# Patient Record
Sex: MALE | Race: BLACK OR AFRICAN AMERICAN | Employment: FULL TIME | URBAN - METROPOLITAN AREA
[De-identification: names, ages, dates, MRNs, and addresses within clinical notes are randomized per-mention and may not be internally consistent; named-entity substitution may affect disease eponyms.]

---

## 2017-03-07 ENCOUNTER — OFFICE VISIT (OUTPATIENT)
Dept: FAMILY MEDICINE CLINIC | Facility: CLINIC | Age: 42
End: 2017-03-07

## 2017-03-07 VITALS
SYSTOLIC BLOOD PRESSURE: 133 MMHG | HEART RATE: 88 BPM | HEIGHT: 71 IN | WEIGHT: 315 LBS | BODY MASS INDEX: 44.1 KG/M2 | DIASTOLIC BLOOD PRESSURE: 83 MMHG

## 2017-03-07 DIAGNOSIS — R35.0 URINARY FREQUENCY: Primary | ICD-10-CM

## 2017-03-07 DIAGNOSIS — E66.01 MORBID OBESITY, UNSPECIFIED OBESITY TYPE (HCC): ICD-10-CM

## 2017-03-07 PROCEDURE — 99212 OFFICE O/P EST SF 10 MIN: CPT | Performed by: FAMILY MEDICINE

## 2017-03-07 PROCEDURE — 99202 OFFICE O/P NEW SF 15 MIN: CPT | Performed by: FAMILY MEDICINE

## 2017-03-07 NOTE — PROGRESS NOTES
HPI:    Oleg Cortes is a 35year old male presents to clinic as a new patient with concerns regarding diabetes.  Patient states that he has multiple family members with this, states that his mother and older brother passed away with complications of di Left Ear: Tympanic membrane, external ear and ear canal normal.   Nose: Nose normal.   Mouth/Throat: Uvula is midline, oropharynx is clear and moist and mucous membranes are normal.   Neck: Normal range of motion. Neck supple. No thyromegaly present.    C

## 2017-03-10 LAB
ABSOLUTE BASOPHILS: 32 CELLS/UL (ref 0–200)
ABSOLUTE EOSINOPHILS: 54 CELLS/UL (ref 15–500)
ABSOLUTE LYMPHOCYTES: 1744 CELLS/UL (ref 850–3900)
ABSOLUTE MONOCYTES: 319 CELLS/UL (ref 200–950)
ABSOLUTE NEUTROPHILS: 3251 CELLS/UL (ref 1500–7800)
ALBUMIN/GLOBULIN RATIO: 1.2 (CALC) (ref 1–2.5)
ALBUMIN: 3.9 G/DL (ref 3.6–5.1)
ALKALINE PHOSPHATASE: 106 U/L (ref 40–115)
ALT: 52 U/L (ref 9–46)
AST: 23 U/L (ref 10–40)
BASOPHILS: 0.6 %
BILIRUBIN, TOTAL: 0.5 MG/DL (ref 0.2–1.2)
BUN: 16 MG/DL (ref 7–25)
CALCIUM: 9 MG/DL (ref 8.6–10.3)
CARBON DIOXIDE: 21 MMOL/L (ref 20–31)
CHLORIDE: 101 MMOL/L (ref 98–110)
CHOL/HDLC RATIO: 2.6 (CALC)
CHOLESTEROL, TOTAL: 148 MG/DL (ref 125–200)
CREATININE, RANDOM URINE: 130 MG/DL (ref 20–370)
CREATININE: 1.11 MG/DL (ref 0.6–1.35)
EGFR IF AFRICN AM: 94 ML/MIN/1.73M2
EGFR IF NONAFRICN AM: 81 ML/MIN/1.73M2
EOSINOPHILS: 1 %
GLOBULIN: 3.3 G/DL (CALC) (ref 1.9–3.7)
GLUCOSE: 365 MG/DL (ref 65–99)
HDL CHOLESTEROL: 58 MG/DL
HEMATOCRIT: 43.7 % (ref 38.5–50)
HEMOGLOBIN A1C: 14.3 % OF TOTAL HGB
HEMOGLOBIN: 14.3 G/DL (ref 13.2–17.1)
LDL-CHOLESTEROL: 71 MG/DL (CALC)
LYMPHOCYTES: 32.3 %
MCH: 28.4 PG (ref 27–33)
MCHC: 32.9 G/DL (ref 32–36)
MCV: 86.5 FL (ref 80–100)
MICROALBUMIN/CREATININE RATIO, RANDOM URINE: 60 MCG/MG CREAT
MICROALBUMIN: 7.8 MG/DL
MONOCYTES: 5.9 %
MPV: 10.6 FL (ref 7.5–12.5)
NEUTROPHILS: 60.2 %
NON-HDL CHOLESTEROL: 90 MG/DL (CALC)
PLATELET COUNT: 200 THOUSAND/UL (ref 140–400)
POTASSIUM: 3.7 MMOL/L (ref 3.5–5.3)
PROTEIN, TOTAL: 7.2 G/DL (ref 6.1–8.1)
RDW: 15.3 % (ref 11–15)
RED BLOOD CELL COUNT: 5.04 MILLION/UL (ref 4.2–5.8)
SODIUM: 136 MMOL/L (ref 135–146)
TRIGLYCERIDES: 94 MG/DL
TSH: 3.21 MIU/L (ref 0.4–4.5)
WHITE BLOOD CELL COUNT: 5.4 THOUSAND/UL (ref 3.8–10.8)

## 2017-03-13 ENCOUNTER — TELEPHONE (OUTPATIENT)
Dept: FAMILY MEDICINE CLINIC | Facility: CLINIC | Age: 42
End: 2017-03-13

## 2017-03-13 DIAGNOSIS — E11.9 TYPE 2 DIABETES MELLITUS WITHOUT COMPLICATION, WITHOUT LONG-TERM CURRENT USE OF INSULIN (HCC): ICD-10-CM

## 2017-03-13 DIAGNOSIS — R73.09 ELEVATED HEMOGLOBIN A1C: Primary | ICD-10-CM

## 2017-03-13 RX ORDER — CIPROFLOXACIN 500 MG/1
500 TABLET, FILM COATED ORAL 2 TIMES DAILY
Qty: 14 TABLET | Refills: 0 | Status: SHIPPED | OUTPATIENT
Start: 2017-03-13 | End: 2017-03-20

## 2017-03-13 NOTE — TELEPHONE ENCOUNTER
Spoke to patient about results. Elevated A1C of 14.3 and protein in urine - patient informed her is diabetic. Will likely need to be started on insulin as A1C is too high for oral meds. Referred to Endo, strongly advised to make appt within 1 week.   + ur

## 2017-04-05 ENCOUNTER — OFFICE VISIT (OUTPATIENT)
Dept: ENDOCRINOLOGY CLINIC | Facility: CLINIC | Age: 42
End: 2017-04-05

## 2017-04-05 VITALS
DIASTOLIC BLOOD PRESSURE: 80 MMHG | SYSTOLIC BLOOD PRESSURE: 147 MMHG | BODY MASS INDEX: 44.1 KG/M2 | HEIGHT: 71 IN | HEART RATE: 62 BPM | WEIGHT: 315 LBS

## 2017-04-05 DIAGNOSIS — E11.65 UNCONTROLLED TYPE 2 DIABETES MELLITUS WITH HYPERGLYCEMIA, WITHOUT LONG-TERM CURRENT USE OF INSULIN (HCC): Primary | ICD-10-CM

## 2017-04-05 PROBLEM — E11.9 DIABETES (HCC): Status: ACTIVE | Noted: 2017-04-05

## 2017-04-05 PROCEDURE — 36416 COLLJ CAPILLARY BLOOD SPEC: CPT | Performed by: INTERNAL MEDICINE

## 2017-04-05 PROCEDURE — 82962 GLUCOSE BLOOD TEST: CPT | Performed by: INTERNAL MEDICINE

## 2017-04-05 PROCEDURE — 99244 OFF/OP CNSLTJ NEW/EST MOD 40: CPT | Performed by: INTERNAL MEDICINE

## 2017-04-05 NOTE — H&P
New Patient Visit - Diabetes    CONSULT - Reason for Visit:  Diabetes management.     Requesting Physician: Chanelle Muñoz MD    CHIEF COMPLAINT:    DM     HISTORY OF PRESENT ILLNESS:   Yoel Wilks is a 35year old male who presents to establish care fo Comment: occ      FAMILY HISTORY:   History reviewed. No pertinent family history.     ASSESSMENTS:        REVIEW OF SYSTEMS:  Constitutional: Negative for: weight change, fever, fatigue, cold/heat intolerance  Eyes: Negative for:  Visual jeong DM:       Plan:  Discussed the pathogenesis, natural course of diabetes.  Patient understands the importance of glycemic control and the implications of uncontrolled diabetes including Diabetic ketoacidosis and various micro vascular and macrovascular compl week. g). Hypoglycemia recognition and management discussed    2. Patient’s BP is slightly elevated today. Discussed low salt diet.   3. LDL is normal.   A) Discussed lifestyle modifications including reductions in dietary total and saturated fat, weight

## 2017-04-05 NOTE — PATIENT INSTRUCTIONS
Start medications as follows:    1. Metformin 500 mg ( half a pill) with breakfast and dinner for 2-3 days . If you tolerate it well, can go up to 1000 mg (one pill) with breakfast and dinner. 2. Victoza:  Start with 0.6 for 3 days.  If you tolerate it

## 2017-04-10 ENCOUNTER — TELEPHONE (OUTPATIENT)
Dept: ENDOCRINOLOGY CLINIC | Facility: CLINIC | Age: 42
End: 2017-04-10

## 2017-04-10 NOTE — TELEPHONE ENCOUNTER
Spoke with Isela Higgins. He started Victoza on 4/6/2017. After starting he noticed back pain, nausea, diarrhea and pain in chest. He has been taking the 0.6mg dose and took it every day except he skipped it yesterday and today due to symptoms.  Also taking Metform

## 2017-04-10 NOTE — TELEPHONE ENCOUNTER
Pt states that he has been having problems with diarrhea, back pain and chest pain and thinks it is from [address] [place] [address] Street. Symptoms started 2 days ago. Call transferred to RN.

## 2017-04-10 NOTE — TELEPHONE ENCOUNTER
Called Dmitri. Informed him that he needs to stop Victoza. He will do so. Informed him of Jardiance and it's side effects. He is wondering if he can wait to start any new medication until his next F/U on 4/25.  He will go to the ER if his symptoms worsen or

## 2017-04-10 NOTE — TELEPHONE ENCOUNTER
Spoke with Adelita Wong and informed him of Dr. Kenzie Leach message below. He verbalized his understanding and will call if his BG are presistently >200.

## 2017-04-10 NOTE — TELEPHONE ENCOUNTER
Stop Victoza. Start Jardiance 10 mg daily. He will need the coupon. Please tell him that if it is not covered, he should let us know. Please let him know of SE of UTI and fungal infections. Please encourage him to drink a lot of water.   If symptoms wors

## 2017-04-25 ENCOUNTER — OFFICE VISIT (OUTPATIENT)
Dept: ENDOCRINOLOGY CLINIC | Facility: CLINIC | Age: 42
End: 2017-04-25

## 2017-04-25 VITALS
DIASTOLIC BLOOD PRESSURE: 83 MMHG | HEART RATE: 81 BPM | HEIGHT: 71 IN | BODY MASS INDEX: 44.1 KG/M2 | SYSTOLIC BLOOD PRESSURE: 121 MMHG | WEIGHT: 315 LBS

## 2017-04-25 DIAGNOSIS — E11.65 UNCONTROLLED TYPE 2 DIABETES MELLITUS WITH HYPERGLYCEMIA, WITHOUT LONG-TERM CURRENT USE OF INSULIN (HCC): Primary | ICD-10-CM

## 2017-04-25 PROCEDURE — 36416 COLLJ CAPILLARY BLOOD SPEC: CPT | Performed by: INTERNAL MEDICINE

## 2017-04-25 PROCEDURE — 82962 GLUCOSE BLOOD TEST: CPT | Performed by: INTERNAL MEDICINE

## 2017-04-25 PROCEDURE — 99213 OFFICE O/P EST LOW 20 MIN: CPT | Performed by: INTERNAL MEDICINE

## 2017-04-25 NOTE — PROGRESS NOTES
Return Office Visit     CHIEF COMPLAINT:  Patient presents with:  Diabetes       HISTORY OF PRESENT ILLNESS:  Vinod Martines is a 35year old male who presents for follow up for for Diabetes.     DM HISTORY  Diagnosed: 3/2017    HISTORY OF DIABETES COMPLICA fever, fatigue, cold/heat intolerance  Eyes: Negative for:  Visual changes, proptosis, blurring  ENT: Negative for:  dysphagia, neck swelling, dysphonia  Respiratory: Negative for:  dyspnea, cough  Cardiovascular: Negative for:  chest pain, palpitations, o glycemic control and the implications of uncontrolled diabetes including Diabetic ketoacidosis and various micro vascular and macrovascular complications. Goal a1c: < 7 %   Goal Fasting, Goal pre meal:   a).  Medications:  Stopped MTF and victoza d

## 2017-07-28 ENCOUNTER — OFFICE VISIT (OUTPATIENT)
Dept: FAMILY MEDICINE CLINIC | Facility: CLINIC | Age: 42
End: 2017-07-28

## 2017-07-28 VITALS
DIASTOLIC BLOOD PRESSURE: 81 MMHG | BODY MASS INDEX: 52 KG/M2 | TEMPERATURE: 98 F | RESPIRATION RATE: 18 BRPM | SYSTOLIC BLOOD PRESSURE: 128 MMHG | HEART RATE: 54 BPM | WEIGHT: 315 LBS

## 2017-07-28 DIAGNOSIS — E11.9 TYPE 2 DIABETES MELLITUS WITHOUT COMPLICATION, WITHOUT LONG-TERM CURRENT USE OF INSULIN (HCC): Primary | ICD-10-CM

## 2017-07-28 DIAGNOSIS — G43.809 OTHER TYPE OF MIGRAINE: ICD-10-CM

## 2017-07-28 PROCEDURE — 99214 OFFICE O/P EST MOD 30 MIN: CPT | Performed by: FAMILY MEDICINE

## 2017-07-28 PROCEDURE — 99212 OFFICE O/P EST SF 10 MIN: CPT | Performed by: FAMILY MEDICINE

## 2017-07-28 NOTE — PROGRESS NOTES
HPI:    Armin Khan is a 38year old male presents to clinic for follow up regarding type 2 DM. States that at the khanh of April, he saw Endo and was started on Victoza, had a reaction to the med so stopped taking it.  Did nto follow up and is currently Negative. Neurological: Positive for headaches.          PHYSICAL EXAM:      07/28/17  0851   BP: 128/81   Pulse: 54   Resp: 18   Temp: 98 °F (36.7 °C)   TempSrc: Oral   Weight: (!) 373 lb (169.2 kg)      Physical Exam   Constitutional: He is well-develo

## 2017-07-29 LAB
ALBUMIN/GLOBULIN RATIO: 1.2 (CALC) (ref 1–2.5)
ALBUMIN: 3.9 G/DL (ref 3.6–5.1)
ALKALINE PHOSPHATASE: 81 U/L (ref 40–115)
ALT: 15 U/L (ref 9–46)
AST: 12 U/L (ref 10–40)
BILIRUBIN, TOTAL: 0.4 MG/DL (ref 0.2–1.2)
BUN: 17 MG/DL (ref 7–25)
CALCIUM: 9 MG/DL (ref 8.6–10.3)
CARBON DIOXIDE: 27 MMOL/L (ref 20–31)
CHLORIDE: 108 MMOL/L (ref 98–110)
CREATININE, RANDOM URINE: 163 MG/DL (ref 20–370)
CREATININE: 1.15 MG/DL (ref 0.6–1.35)
EGFR IF AFRICN AM: 89 ML/MIN/1.73M2
EGFR IF NONAFRICN AM: 77 ML/MIN/1.73M2
GLOBULIN: 3.3 G/DL (CALC) (ref 1.9–3.7)
GLUCOSE: 100 MG/DL (ref 65–99)
HEMOGLOBIN A1C: 6.3 % OF TOTAL HGB
MICROALBUMIN/CREATININE RATIO, RANDOM URINE: 6 MCG/MG CREAT
MICROALBUMIN: 0.9 MG/DL
POTASSIUM: 4.1 MMOL/L (ref 3.5–5.3)
PROTEIN, TOTAL: 7.2 G/DL (ref 6.1–8.1)
SODIUM: 143 MMOL/L (ref 135–146)

## 2017-08-01 ENCOUNTER — TELEPHONE (OUTPATIENT)
Dept: ADMINISTRATIVE | Age: 42
End: 2017-08-01

## 2017-08-01 NOTE — TELEPHONE ENCOUNTER
Good afternoon Dr. Gutierrze Read form pending in [employer]. Patient is requesting intermittent time off for migraines 4-5 days per month, do you approve? Please advise.     Thanks  Zbigniew Wilson

## 2017-08-14 ENCOUNTER — OFFICE VISIT (OUTPATIENT)
Dept: FAMILY MEDICINE CLINIC | Facility: CLINIC | Age: 42
End: 2017-08-14

## 2017-08-14 ENCOUNTER — TELEPHONE (OUTPATIENT)
Dept: FAMILY MEDICINE CLINIC | Facility: CLINIC | Age: 42
End: 2017-08-14

## 2017-08-14 ENCOUNTER — OFFICE VISIT (OUTPATIENT)
Dept: ENDOCRINOLOGY CLINIC | Facility: CLINIC | Age: 42
End: 2017-08-14

## 2017-08-14 VITALS
WEIGHT: 315 LBS | DIASTOLIC BLOOD PRESSURE: 90 MMHG | HEART RATE: 68 BPM | BODY MASS INDEX: 42.66 KG/M2 | SYSTOLIC BLOOD PRESSURE: 128 MMHG | HEIGHT: 72 IN

## 2017-08-14 VITALS
WEIGHT: 273 LBS | BODY MASS INDEX: 36.98 KG/M2 | HEIGHT: 72 IN | HEART RATE: 80 BPM | TEMPERATURE: 98 F | SYSTOLIC BLOOD PRESSURE: 138 MMHG | DIASTOLIC BLOOD PRESSURE: 81 MMHG | RESPIRATION RATE: 14 BRPM

## 2017-08-14 DIAGNOSIS — E11.9 CONTROLLED TYPE 2 DIABETES MELLITUS WITHOUT COMPLICATION, WITHOUT LONG-TERM CURRENT USE OF INSULIN (HCC): Primary | ICD-10-CM

## 2017-08-14 DIAGNOSIS — S89.91XD KNEE INJURY, RIGHT, SUBSEQUENT ENCOUNTER: ICD-10-CM

## 2017-08-14 DIAGNOSIS — G43.809 OTHER TYPE OF MIGRAINE: Primary | ICD-10-CM

## 2017-08-14 LAB
GLUCOSE BLOOD: 107
TEST STRIP LOT #: NORMAL NUMERIC

## 2017-08-14 PROCEDURE — 99214 OFFICE O/P EST MOD 30 MIN: CPT | Performed by: FAMILY MEDICINE

## 2017-08-14 PROCEDURE — 99213 OFFICE O/P EST LOW 20 MIN: CPT | Performed by: INTERNAL MEDICINE

## 2017-08-14 PROCEDURE — 82962 GLUCOSE BLOOD TEST: CPT | Performed by: INTERNAL MEDICINE

## 2017-08-14 PROCEDURE — 99212 OFFICE O/P EST SF 10 MIN: CPT | Performed by: FAMILY MEDICINE

## 2017-08-14 PROCEDURE — 36416 COLLJ CAPILLARY BLOOD SPEC: CPT | Performed by: INTERNAL MEDICINE

## 2017-08-14 NOTE — PROGRESS NOTES
Return Office Visit     CHIEF COMPLAINT:    DM     HISTORY OF PRESENT ILLNESS:  Kevin Llanos is a 38year old male who presents for follow up for for Diabetes.     DM HISTORY  Diagnosed: 3/2017    HISTORY OF DIABETES COMPLICATIONS: :  History of Retinopat weakness  Genito-Urinary: Negative for: dysuria, frequency  Psychiatric: Negative for:  depression, anxiety  Hematology/Lymphatics: Negative for: bruising, lower extremity edema  Endocrine: Negative for: polyuria, polydypsia  Skin: Negative for: rash, blis a referral for eye exam. Would like him to schedule it once BG are better controlled. d). Foot exam: Daily feet exam explained, Monofilament sensation normal.   e). BG log maintainence explained in great detail, to get log and glucometer on next visit.  Gl

## 2017-08-14 NOTE — TELEPHONE ENCOUNTER
Pt dropped off forms while at appt w/ SK today. Forms faxed and interoffice to [employer] for completion. Confirmation rcvd. Copy sent to scan.

## 2017-08-15 NOTE — PROGRESS NOTES
HPI:    Albino Mendoza is a 38year old male presents to clinic for follow up. Migraines- states that on August 3rd he experienced symptoms so took the the day off.  States that this was 2 days before his FMLA went through so was asked to fill out anoth Pulmonary/Chest: Effort normal and breath sounds normal. No respiratory distress. He has no wheezes. He has no rales. Musculoskeletal:   Right knee - + swelling, no diffuse tenderness, Lachman's neg    Neurological: He is alert.  Gait normal.   Psychiat

## 2017-09-05 ENCOUNTER — HOSPITAL ENCOUNTER (OUTPATIENT)
Dept: MRI IMAGING | Facility: HOSPITAL | Age: 42
Discharge: HOME | End: 2017-09-05
Attending: FAMILY MEDICINE
Payer: COMMERCIAL

## 2017-09-05 DIAGNOSIS — S89.91XD KNEE INJURY, RIGHT, SUBSEQUENT ENCOUNTER: ICD-10-CM

## 2017-09-05 PROCEDURE — 73721 MRI JNT OF LWR EXTRE W/O DYE: CPT | Performed by: FAMILY MEDICINE

## 2017-09-12 ENCOUNTER — TELEPHONE (OUTPATIENT)
Dept: FAMILY MEDICINE CLINIC | Facility: CLINIC | Age: 42
End: 2017-09-12

## 2017-09-12 DIAGNOSIS — R93.6 ABNORMAL MRI, KNEE: Primary | ICD-10-CM

## 2017-09-12 NOTE — TELEPHONE ENCOUNTER
Patient inform of MRI results, will follow up with Ortho. Referral placed, info given to schedule. Patient verbalized understanding, all questions answered.

## 2017-12-12 ENCOUNTER — TELEPHONE (OUTPATIENT)
Dept: FAMILY MEDICINE CLINIC | Facility: CLINIC | Age: 42
End: 2017-12-12

## 2017-12-12 NOTE — TELEPHONE ENCOUNTER
Pt calling to request to speak with Dr. Maggie Lang regarding his health and FMLA.  Pt just has a few question that he would like to ask her   Please advise

## 2017-12-13 NOTE — TELEPHONE ENCOUNTER
Patient informed he would need to schedule appointment with physician. Patient verbalized understanding and will call back to schedule.

## 2018-02-16 ENCOUNTER — TELEPHONE (OUTPATIENT)
Dept: FAMILY MEDICINE CLINIC | Facility: CLINIC | Age: 43
End: 2018-02-16

## 2018-02-16 ENCOUNTER — OFFICE VISIT (OUTPATIENT)
Dept: FAMILY MEDICINE CLINIC | Facility: CLINIC | Age: 43
End: 2018-02-16

## 2018-02-16 VITALS
BODY MASS INDEX: 42.66 KG/M2 | WEIGHT: 315 LBS | HEART RATE: 90 BPM | TEMPERATURE: 98 F | HEIGHT: 72 IN | DIASTOLIC BLOOD PRESSURE: 80 MMHG | RESPIRATION RATE: 14 BRPM | SYSTOLIC BLOOD PRESSURE: 128 MMHG

## 2018-02-16 DIAGNOSIS — G43.709 CHRONIC MIGRAINE WITHOUT AURA WITHOUT STATUS MIGRAINOSUS, NOT INTRACTABLE: Primary | ICD-10-CM

## 2018-02-16 DIAGNOSIS — E11.9 TYPE 2 DIABETES MELLITUS WITHOUT COMPLICATION, WITHOUT LONG-TERM CURRENT USE OF INSULIN (HCC): ICD-10-CM

## 2018-02-16 DIAGNOSIS — R63.5 WEIGHT GAIN: ICD-10-CM

## 2018-02-16 PROCEDURE — 99212 OFFICE O/P EST SF 10 MIN: CPT | Performed by: FAMILY MEDICINE

## 2018-02-16 PROCEDURE — 99214 OFFICE O/P EST MOD 30 MIN: CPT | Performed by: FAMILY MEDICINE

## 2018-02-16 NOTE — TELEPHONE ENCOUNTER
Patient came in for office visit and dropped of FMLA forms to be completed. Copy of forms faxed to [employer] and sent for scanning.

## 2018-02-16 NOTE — PROGRESS NOTES
HPI:    Abhijeet Pereira is a 38year old male presents to clinic for follow up. Says that his job and girlfriend stresses him out which causes migraines. Gets about 2-3 a week. Takes Advil which helps. Needs [facility] papers updated.    Also, has gained 20 lbs breath sounds normal. No respiratory distress. Lymphadenopathy:     He has no cervical adenopathy. Neurological: He is alert. Gait normal.   Psychiatric: Affect normal.   Vitals reviewed.          ASSESSMENT/PLAN:   Chronic migraine without aura without

## 2018-02-17 LAB
ALBUMIN/GLOBULIN RATIO: 1.3 (CALC) (ref 1–2.5)
ALBUMIN: 4.1 G/DL (ref 3.6–5.1)
ALKALINE PHOSPHATASE: 77 U/L (ref 40–115)
ALT: 20 U/L (ref 9–46)
AST: 13 U/L (ref 10–40)
BILIRUBIN, TOTAL: 0.4 MG/DL (ref 0.2–1.2)
BUN: 16 MG/DL (ref 7–25)
CALCIUM: 9.1 MG/DL (ref 8.6–10.3)
CARBON DIOXIDE: 25 MMOL/L (ref 20–31)
CHLORIDE: 104 MMOL/L (ref 98–110)
CHOL/HDLC RATIO: 2.4 (CALC)
CHOLESTEROL, TOTAL: 145 MG/DL
CREATININE: 1.09 MG/DL (ref 0.6–1.35)
EGFR IF AFRICN AM: 95 ML/MIN/1.73M2
EGFR IF NONAFRICN AM: 82 ML/MIN/1.73M2
GLOBULIN: 3.2 G/DL (CALC) (ref 1.9–3.7)
GLUCOSE: 309 MG/DL (ref 65–99)
HDL CHOLESTEROL: 60 MG/DL
HEMOGLOBIN A1C: 8.7 % OF TOTAL HGB
LDL-CHOLESTEROL: 71 MG/DL (CALC)
NON-HDL CHOLESTEROL: 85 MG/DL (CALC)
POTASSIUM: 4.4 MMOL/L (ref 3.5–5.3)
PROTEIN, TOTAL: 7.3 G/DL (ref 6.1–8.1)
SODIUM: 138 MMOL/L (ref 135–146)
TRIGLYCERIDES: 68 MG/DL

## 2018-02-19 ENCOUNTER — TELEPHONE (OUTPATIENT)
Dept: FAMILY MEDICINE CLINIC | Facility: CLINIC | Age: 43
End: 2018-02-19

## 2018-02-19 NOTE — TELEPHONE ENCOUNTER
Called patient to discuss results. Informed of an A1C of 8.7. States that he absolutely does not want to start meds again, last time they \"messed him up. \" Would like to try diet changes.  I did advise that he start taking Metformin again at the very least

## 2018-02-22 NOTE — TELEPHONE ENCOUNTER
Form pending provider sign off and pt will need to pay and sign HIPAA release. NK informed pt.   MIGDALIA

## 2018-02-22 NOTE — TELEPHONE ENCOUNTER
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 Logged for processing.  NK

## 2018-02-22 NOTE — TELEPHONE ENCOUNTER
Spoke w/ pt. Will come to [facility] tomorrow to sign release an make payment. Form is due 79/33, recert.  NK

## 2018-02-22 NOTE — TELEPHONE ENCOUNTER
Dr. Anoop Rojas,    Pt requesting recert on FMLA. DX - Chronic migraines  1 to 3 days per mo and 1 to 2 appts every 6 mos. If you approve please sign off. Please sign off on form:  -Highlight the patient and hit \"Chart\" button.   -In Chart Review,

## 2018-02-23 NOTE — TELEPHONE ENCOUNTER
Pt came to [employer]PO to sign HIPPA,FCR and pay $25 processing fee.  Forms scanned to [employer] and copy left in [facility] [employer]

## 2018-05-07 ENCOUNTER — TELEPHONE (OUTPATIENT)
Dept: FAMILY MEDICINE CLINIC | Facility: CLINIC | Age: 43
End: 2018-05-07

## 2018-05-07 NOTE — TELEPHONE ENCOUNTER
FMLA forms received in the [place] office via fax for Isela Higgins. Forms emailed to [email or web address], original forms left in the [place] office for [place].

## 2018-05-07 NOTE — TELEPHONE ENCOUNTER
Pt is requesting a sooner appt to get FMLA papers completed and states need to be completed and submitted back by Monday, 5/14/2018 and states can leave a voicemail.      Please reply to pool: PARRISH Da Silva

## 2018-05-09 ENCOUNTER — OFFICE VISIT (OUTPATIENT)
Dept: FAMILY MEDICINE CLINIC | Facility: CLINIC | Age: 43
End: 2018-05-09

## 2018-05-09 ENCOUNTER — TELEPHONE (OUTPATIENT)
Dept: FAMILY MEDICINE CLINIC | Facility: CLINIC | Age: 43
End: 2018-05-09

## 2018-05-09 VITALS
HEIGHT: 72 IN | TEMPERATURE: 97 F | HEART RATE: 79 BPM | DIASTOLIC BLOOD PRESSURE: 76 MMHG | SYSTOLIC BLOOD PRESSURE: 123 MMHG | RESPIRATION RATE: 14 BRPM

## 2018-05-09 DIAGNOSIS — G43.709 CHRONIC MIGRAINE WITHOUT AURA WITHOUT STATUS MIGRAINOSUS, NOT INTRACTABLE: Primary | ICD-10-CM

## 2018-05-09 DIAGNOSIS — E11.9 TYPE 2 DIABETES MELLITUS WITHOUT COMPLICATION, WITHOUT LONG-TERM CURRENT USE OF INSULIN (HCC): ICD-10-CM

## 2018-05-09 PROCEDURE — 99212 OFFICE O/P EST SF 10 MIN: CPT | Performed by: FAMILY MEDICINE

## 2018-05-09 PROCEDURE — 99214 OFFICE O/P EST MOD 30 MIN: CPT | Performed by: FAMILY MEDICINE

## 2018-05-09 NOTE — TELEPHONE ENCOUNTER
Lisa Magallanes had an appt with Dr. Juana Agee today, FMLA forms were left for completion. Forms, signed HIPAA and payment form emailed to [email or web address], original forms left in the [facility] office for [employer].

## 2018-05-09 NOTE — TELEPHONE ENCOUNTER
FMLA recrt form for Dr. Abby Edmondson received in [address]. Logged for processing. Need to inform pt for $15 update charge.  NK

## 2018-05-09 NOTE — H&P
HPI:    Vaishali Grayson is a 38year old male presents to clinic for follow up. Needs FMLA forms updated. Says that he was told at work that there is a pattern to him taking days off, he always takes them on the weekends.  Migraines have not improved or wo distress. He has no wheezes. He has no rales. Lymphadenopathy:     He has no cervical adenopathy. Neurological: He is alert. Gait normal.   Psychiatric: Affect normal.   Vitals reviewed.        ASSESSMENT/PLAN:   Chronic migraine without aura without st

## 2018-05-11 ENCOUNTER — TELEPHONE (OUTPATIENT)
Dept: FAMILY MEDICINE CLINIC | Facility: CLINIC | Age: 43
End: 2018-05-11

## 2018-05-11 NOTE — TELEPHONE ENCOUNTER
Called patient for test results, he did not answer so a VM was left asking him to call back. When he calls please let him know the following:   A1C is still high, 7.6.  I strongly recommend treating this to avoid complications from Type 2 DM that we discu

## 2018-05-14 NOTE — TELEPHONE ENCOUNTER
Pt called, message per Dr given. Verbalized understanding   Will follow up in 3 months.   Refuses medication at this time

## 2018-05-16 NOTE — TELEPHONE ENCOUNTER
Form/ appeal faxed 05/11 and mailed to [facility], copy mailed to pt. Pt paid $25 w/ .  NK

## 2018-05-16 NOTE — TELEPHONE ENCOUNTER
Note -From Rocky Rivera - 05/09/18 3:46 pm  Americo Rivers had an appt with Dr. Leonard Foss today, FMLA forms were left for completion. Forms, signed HIPAA and payment form emailed to [email or web address], original forms left in the [facility] office for [employer].

## 2018-10-26 ENCOUNTER — OFFICE VISIT (OUTPATIENT)
Dept: FAMILY MEDICINE CLINIC | Facility: CLINIC | Age: 43
End: 2018-10-26
Payer: COMMERCIAL

## 2018-10-26 ENCOUNTER — TELEPHONE (OUTPATIENT)
Dept: FAMILY MEDICINE CLINIC | Facility: CLINIC | Age: 43
End: 2018-10-26

## 2018-10-26 VITALS
DIASTOLIC BLOOD PRESSURE: 79 MMHG | SYSTOLIC BLOOD PRESSURE: 122 MMHG | BODY MASS INDEX: 42.66 KG/M2 | HEIGHT: 72 IN | TEMPERATURE: 98 F | WEIGHT: 315 LBS | HEART RATE: 89 BPM

## 2018-10-26 DIAGNOSIS — G43.009 MIGRAINE WITHOUT AURA AND WITHOUT STATUS MIGRAINOSUS, NOT INTRACTABLE: ICD-10-CM

## 2018-10-26 DIAGNOSIS — E11.9 TYPE 2 DIABETES MELLITUS WITHOUT COMPLICATION, WITHOUT LONG-TERM CURRENT USE OF INSULIN (HCC): Primary | ICD-10-CM

## 2018-10-26 DIAGNOSIS — G89.29 CHRONIC PAIN OF RIGHT KNEE: ICD-10-CM

## 2018-10-26 DIAGNOSIS — M25.561 CHRONIC PAIN OF RIGHT KNEE: ICD-10-CM

## 2018-10-26 PROCEDURE — 99212 OFFICE O/P EST SF 10 MIN: CPT | Performed by: FAMILY MEDICINE

## 2018-10-26 PROCEDURE — 99214 OFFICE O/P EST MOD 30 MIN: CPT | Performed by: FAMILY MEDICINE

## 2018-10-26 NOTE — TELEPHONE ENCOUNTER
Pt dropped off FMLA forms to be completed while at [address]. Pt signed HIPAA+FCR+PD $25 processing fee. Forms scanned to [employer] for completion.

## 2018-10-28 NOTE — TELEPHONE ENCOUNTER
Minoo Sneed form for Dr. Adrian Lucas received in [employer]+ FCR+ Signed release, pt paid $25 with . Logged for processing. Froms were completed in May, is it recert?  NK

## 2018-10-30 NOTE — PROGRESS NOTES
HPI:    More Zhou is a 37year old male presents to clinic for follow up:  Migraines - stable. Still having 1-2 headaches a week, Excedrin helps but headache only resolves with sleep. Needs FMLA papers updated. Type 2 DM- not taking meds.  Notes th to light. Neck: Normal range of motion. Neck supple. No thyromegaly present. Cardiovascular: Normal rate, regular rhythm and normal heart sounds. Pulmonary/Chest: Effort normal and breath sounds normal. No respiratory distress. He has no wheezes.  He

## 2018-10-31 ENCOUNTER — TELEPHONE (OUTPATIENT)
Dept: FAMILY MEDICINE CLINIC | Facility: CLINIC | Age: 43
End: 2018-10-31

## 2018-10-31 DIAGNOSIS — R73.09 ELEVATED HEMOGLOBIN A1C: Primary | ICD-10-CM

## 2018-10-31 NOTE — TELEPHONE ENCOUNTER
Called patient at 4:40 pm on 10/31 to discuss results. No answer so a VM was left.  When he calls back, please inform him of the following: A1C is THROUGH THE ROOF - he went from a 7.6 to a 12.9, needs to contact Dr. Presley Contreras immediately to make a follow u

## 2018-11-01 NOTE — TELEPHONE ENCOUNTER
Dr Osvaldo Davalos, please advise. Patient called, asked status of the forms, given the phone # to call for FMLA completion; he stated he would call to check.     Please respond to pool: EM FM [facility] LPN/CMA

## 2018-11-01 NOTE — TELEPHONE ENCOUNTER
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   -In Chart Review, w/in the Encounter tab - click 1 time on the

## 2018-11-01 NOTE — TELEPHONE ENCOUNTER
Advised patient on Dr. Quan Mems information and recommendation. Patient verbalized understanding.  He was uncertain if he needed a referral so referral was created and he was advised to verify with his insurance, make sure authorization was obtained befor

## 2018-11-02 ENCOUNTER — TELEPHONE (OUTPATIENT)
Dept: ENDOCRINOLOGY CLINIC | Facility: CLINIC | Age: 43
End: 2018-11-02

## 2018-11-02 NOTE — TELEPHONE ENCOUNTER
----- Message from Sarabjit Lozano MD sent at 10/31/2018  4:41 PM CDT -----  LIYAH - I am having him follow up with you.

## 2018-11-20 ENCOUNTER — OFFICE VISIT (OUTPATIENT)
Dept: ENDOCRINOLOGY CLINIC | Facility: CLINIC | Age: 43
End: 2018-11-20
Payer: COMMERCIAL

## 2018-11-20 VITALS
BODY MASS INDEX: 53 KG/M2 | SYSTOLIC BLOOD PRESSURE: 109 MMHG | DIASTOLIC BLOOD PRESSURE: 60 MMHG | WEIGHT: 315 LBS | HEART RATE: 63 BPM

## 2018-11-20 DIAGNOSIS — E11.65 TYPE 2 DIABETES MELLITUS WITH HYPERGLYCEMIA, WITHOUT LONG-TERM CURRENT USE OF INSULIN (HCC): Primary | ICD-10-CM

## 2018-11-20 PROCEDURE — 36416 COLLJ CAPILLARY BLOOD SPEC: CPT | Performed by: INTERNAL MEDICINE

## 2018-11-20 PROCEDURE — 99213 OFFICE O/P EST LOW 20 MIN: CPT | Performed by: INTERNAL MEDICINE

## 2018-11-20 PROCEDURE — 82962 GLUCOSE BLOOD TEST: CPT | Performed by: INTERNAL MEDICINE

## 2018-11-20 PROCEDURE — 99212 OFFICE O/P EST SF 10 MIN: CPT | Performed by: INTERNAL MEDICINE

## 2018-11-20 NOTE — PROGRESS NOTES
Return Office Visit     CHIEF COMPLAINT:    DM     HISTORY OF PRESENT ILLNESS:  Fe Atkinson is a 37year old male who presents for follow up for for Diabetes.     DM HISTORY  Diagnosed: 3/2017    HISTORY OF DIABETES COMPLICATIONS: :  History of Retinopat 11/20/18  0931   BP: 109/60   Pulse: 63   Weight: (!) 391 lb 6.4 oz (177.5 kg)     BMI: Body mass index is 53.08 kg/m².      CONSTITUTIONAL:  awake, alert, cooperative, no apparent distress, and appears stated age  PSYCH: normal affect  EYES:  No proptosis, exam. Would like him to schedule it once BG are better controlled. d). Foot exam: Daily feet exam explained  e). BG log maintainence explained in great detail, to get log and glucometer on next visit.  Glucometer provided and he has been taught how to use

## 2019-01-18 ENCOUNTER — OFFICE VISIT (OUTPATIENT)
Dept: FAMILY MEDICINE CLINIC | Facility: CLINIC | Age: 44
End: 2019-01-18
Payer: COMMERCIAL

## 2019-01-18 VITALS
DIASTOLIC BLOOD PRESSURE: 82 MMHG | RESPIRATION RATE: 18 BRPM | WEIGHT: 315 LBS | SYSTOLIC BLOOD PRESSURE: 126 MMHG | BODY MASS INDEX: 53 KG/M2 | TEMPERATURE: 99 F | HEART RATE: 81 BPM

## 2019-01-18 DIAGNOSIS — G43.909 MIGRAINE WITHOUT STATUS MIGRAINOSUS, NOT INTRACTABLE, UNSPECIFIED MIGRAINE TYPE: Primary | ICD-10-CM

## 2019-01-18 PROCEDURE — 99212 OFFICE O/P EST SF 10 MIN: CPT | Performed by: FAMILY MEDICINE

## 2019-01-18 PROCEDURE — 99213 OFFICE O/P EST LOW 20 MIN: CPT | Performed by: FAMILY MEDICINE

## 2019-01-18 RX ORDER — IBUPROFEN 200 MG
200 TABLET ORAL EVERY 6 HOURS PRN
COMMUNITY

## 2019-01-18 NOTE — PROGRESS NOTES
HPI:    Vamsi Meeks is a 37year old male presents to clinic with concerns regarding a recent flareup of migraines. Notes that he has had an increase in stress and he also has not been sleeping well.   Since yesterday he has been experiencing a sharp diagnosis)  -I did offer a stronger medication to help with migraines, patient declines for now. Has not gone to work yesterday or today, will attempt to return on Monday. Prior to returning to work, will present in clinic for clearance.     Patient Cindra Rubinstein

## 2019-01-22 ENCOUNTER — OFFICE VISIT (OUTPATIENT)
Dept: ENDOCRINOLOGY CLINIC | Facility: CLINIC | Age: 44
End: 2019-01-22
Payer: COMMERCIAL

## 2019-01-22 VITALS
BODY MASS INDEX: 53 KG/M2 | WEIGHT: 315 LBS | HEART RATE: 84 BPM | DIASTOLIC BLOOD PRESSURE: 77 MMHG | SYSTOLIC BLOOD PRESSURE: 122 MMHG

## 2019-01-22 DIAGNOSIS — E11.65 TYPE 2 DIABETES MELLITUS WITH HYPERGLYCEMIA, WITHOUT LONG-TERM CURRENT USE OF INSULIN (HCC): Primary | ICD-10-CM

## 2019-01-22 LAB
CARTRIDGE LOT#: ABNORMAL NUMERIC
GLUCOSE BLOOD: 134
HEMOGLOBIN A1C: 9.1 % (ref 4.3–5.6)
TEST STRIP LOT #: NORMAL NUMERIC

## 2019-01-22 PROCEDURE — 99212 OFFICE O/P EST SF 10 MIN: CPT | Performed by: INTERNAL MEDICINE

## 2019-01-22 PROCEDURE — 83036 HEMOGLOBIN GLYCOSYLATED A1C: CPT | Performed by: INTERNAL MEDICINE

## 2019-01-22 PROCEDURE — 82962 GLUCOSE BLOOD TEST: CPT | Performed by: INTERNAL MEDICINE

## 2019-01-22 PROCEDURE — 36416 COLLJ CAPILLARY BLOOD SPEC: CPT | Performed by: INTERNAL MEDICINE

## 2019-01-22 PROCEDURE — 99213 OFFICE O/P EST LOW 20 MIN: CPT | Performed by: INTERNAL MEDICINE

## 2019-01-22 NOTE — PROGRESS NOTES
Return Office Visit     CHIEF COMPLAINT:    DM     HISTORY OF PRESENT ILLNESS:  Nena Driscoll is a 37year old male who presents for follow up for for Diabetes.     DM HISTORY  Diagnosed: 3/2017    HISTORY OF DIABETES COMPLICATIONS: :  History of Retinopat polydypsia  Skin: Negative for: rash, blister, cellulitis,       PHYSICAL EXAM:    01/22/19  0909   BP: 122/77   Pulse: 84   Weight: (!) 393 lb (178.3 kg)     BMI: Body mass index is 53.3 kg/m².      CONSTITUTIONAL:  awake, alert, cooperative, no apparent d diet discussed, Exercise: should target a weight loss of 7% and increase exercise to at least 150min a week.  g). Hypoglycemia recognition and management discussed    2.  Patient’s BP is normal today.   3. LDL is normal.   A) Discussed lifestyle modificatio

## 2019-02-01 ENCOUNTER — TELEPHONE (OUTPATIENT)
Dept: FAMILY MEDICINE CLINIC | Facility: CLINIC | Age: 44
End: 2019-02-01

## 2019-02-01 ENCOUNTER — OFFICE VISIT (OUTPATIENT)
Dept: FAMILY MEDICINE CLINIC | Facility: CLINIC | Age: 44
End: 2019-02-01
Payer: COMMERCIAL

## 2019-02-01 VITALS
SYSTOLIC BLOOD PRESSURE: 129 MMHG | DIASTOLIC BLOOD PRESSURE: 81 MMHG | HEIGHT: 72 IN | BODY MASS INDEX: 42.66 KG/M2 | WEIGHT: 315 LBS | TEMPERATURE: 99 F | HEART RATE: 76 BPM

## 2019-02-01 DIAGNOSIS — G43.709 CHRONIC MIGRAINE WITHOUT AURA WITHOUT STATUS MIGRAINOSUS, NOT INTRACTABLE: Primary | ICD-10-CM

## 2019-02-01 PROCEDURE — 99213 OFFICE O/P EST LOW 20 MIN: CPT | Performed by: FAMILY MEDICINE

## 2019-02-01 PROCEDURE — 99212 OFFICE O/P EST SF 10 MIN: CPT | Performed by: FAMILY MEDICINE

## 2019-02-01 NOTE — PROGRESS NOTES
HPI:    Sydnee Gaxiola is a 37year old male presents to clinic for follow-up regarding migraines. Patient notes that for the past 2 weeks, he has been having them more frequently. For the past few days, he has not had a headache, is feeling better.   I intractable  (primary encounter diagnosis)  Plan:   - headaches have improved. Patient is cleared to return to work. To follow up PRN     Patient verbalized understanding of information discussed. No barriers to learning observed.                  Orders Th

## 2019-02-01 NOTE — TELEPHONE ENCOUNTER
Pt dropped off FMLA forms to be completed at [address] today. Pt signed HIPAA+FCR+ PD $25 processing fee. Forms scanned over to forms dept for completion.  FORMS DUE 2/6/19

## 2019-02-02 NOTE — TELEPHONE ENCOUNTER
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 Logged for processing.  NK

## 2019-02-06 NOTE — TELEPHONE ENCOUNTER
Disability forms hand signed by Dr. Osvaldo Davalos and faxed to [facility].    -pt contacted and informed disability forms completed and faxed to [facility], copy mailed to pt.

## 2019-04-17 ENCOUNTER — TELEPHONE (OUTPATIENT)
Dept: ADMINISTRATIVE | Age: 44
End: 2019-04-17

## 2019-04-17 ENCOUNTER — OFFICE VISIT (OUTPATIENT)
Dept: FAMILY MEDICINE CLINIC | Facility: CLINIC | Age: 44
End: 2019-04-17
Payer: COMMERCIAL

## 2019-04-17 VITALS
HEART RATE: 67 BPM | HEIGHT: 72 IN | TEMPERATURE: 99 F | WEIGHT: 315 LBS | DIASTOLIC BLOOD PRESSURE: 82 MMHG | BODY MASS INDEX: 42.66 KG/M2 | SYSTOLIC BLOOD PRESSURE: 143 MMHG

## 2019-04-17 DIAGNOSIS — G43.709 CHRONIC MIGRAINE WITHOUT AURA WITHOUT STATUS MIGRAINOSUS, NOT INTRACTABLE: Primary | ICD-10-CM

## 2019-04-17 PROCEDURE — 99213 OFFICE O/P EST LOW 20 MIN: CPT | Performed by: FAMILY MEDICINE

## 2019-04-17 PROCEDURE — 99212 OFFICE O/P EST SF 10 MIN: CPT | Performed by: FAMILY MEDICINE

## 2019-04-18 NOTE — PROGRESS NOTES
HPI:    Carlin Bruno is a 37year old male presents to clinic for follow-up regarding migraines. Reports having between 2 and 4 migraines a month, feels stress and lack of sleep triggers these. Takes Advil for pain relief.   Has tried triptan's in the verbalized understanding of information discussed. No barriers to learning observed. Orders This Visit:  No orders of the defined types were placed in this encounter.       Meds This Visit:  Requested Prescriptions      No prescriptions requested

## 2019-04-22 ENCOUNTER — OFFICE VISIT (OUTPATIENT)
Dept: ENDOCRINOLOGY CLINIC | Facility: CLINIC | Age: 44
End: 2019-04-22
Payer: COMMERCIAL

## 2019-04-22 VITALS
SYSTOLIC BLOOD PRESSURE: 138 MMHG | HEART RATE: 70 BPM | WEIGHT: 315 LBS | DIASTOLIC BLOOD PRESSURE: 80 MMHG | BODY MASS INDEX: 54 KG/M2

## 2019-04-22 DIAGNOSIS — E11.65 TYPE 2 DIABETES MELLITUS WITH HYPERGLYCEMIA, WITHOUT LONG-TERM CURRENT USE OF INSULIN (HCC): Primary | ICD-10-CM

## 2019-04-22 PROCEDURE — 36416 COLLJ CAPILLARY BLOOD SPEC: CPT | Performed by: INTERNAL MEDICINE

## 2019-04-22 PROCEDURE — 99213 OFFICE O/P EST LOW 20 MIN: CPT | Performed by: INTERNAL MEDICINE

## 2019-04-22 PROCEDURE — 82962 GLUCOSE BLOOD TEST: CPT | Performed by: INTERNAL MEDICINE

## 2019-04-22 PROCEDURE — 83036 HEMOGLOBIN GLYCOSYLATED A1C: CPT | Performed by: INTERNAL MEDICINE

## 2019-04-22 NOTE — PROGRESS NOTES
Return Office Visit     CHIEF COMPLAINT:    DM     HISTORY OF PRESENT ILLNESS:  Fe Atkinson is a 37year old male who presents for follow up for for Diabetes.     DM HISTORY  Diagnosed: 3/2017    HISTORY OF DIABETES COMPLICATIONS: :  History of Retinopat (!) 399 lb 12.8 oz (181.3 kg)     BMI: Body mass index is 54.22 kg/m².      CONSTITUTIONAL:  awake, alert, cooperative, no apparent distress, and appears stated age  PSYCH: normal affect  EYES:  No proptosis, no ptosis, conjunctiva normal  ENT:  Normocephal week. g). Hypoglycemia recognition and management discussed    2.  Patient’s BP is normal today.   3. LDL is normal.   A) Discussed lifestyle modifications including reductions in dietary total and saturated fat, weight loss, aerobic exercise, and eating a

## 2019-04-22 NOTE — TELEPHONE ENCOUNTER
Dr. Jadyn Rutherford    Please sign off on form:  -Highlight the patient and hit \"Chart\" button. -In Chart Review, w/in the Encounter tab - click 1 time on the Telephone call encounter for 4/17/19.  Scroll down the telephone encounter.  -Click \"scan on\" blue H

## 2019-04-23 NOTE — TELEPHONE ENCOUNTER
FMLA forms [facility] [phone], confirm rcvd. Copy mailed to pt home address.

## 2019-07-19 DIAGNOSIS — E11.65 UNCONTROLLED TYPE 2 DIABETES MELLITUS WITH HYPERGLYCEMIA (HCC): Primary | ICD-10-CM

## 2019-08-12 ENCOUNTER — TELEPHONE (OUTPATIENT)
Dept: FAMILY MEDICINE CLINIC | Facility: CLINIC | Age: 44
End: 2019-08-12

## 2019-08-12 NOTE — TELEPHONE ENCOUNTER
Pt called and stated that FMLA paper should be coming over via faxed from [place].   He need them filled out by Aug 23 he trying to schedule a appt to see Dr. Abbey Dominguez to have them filled out wanted to know can he book one before then if you have something available       Please advise

## 2019-08-13 ENCOUNTER — TELEPHONE (OUTPATIENT)
Dept: FAMILY MEDICINE CLINIC | Facility: CLINIC | Age: 44
End: 2019-08-13

## 2019-08-13 NOTE — TELEPHONE ENCOUNTER
FMLA forms received in the [facility] office via fax for patient. FMLA forms faxed to [email or web address], original forms left in the [facility] office.

## 2019-08-13 NOTE — TELEPHONE ENCOUNTER
Patient called back. RN asked patient if he is aware of which glucose machine he has at home so strips can be ordered based on machine he uses. Patient states he is at work now and does not know what machine he has at home.      Patient states he will c

## 2019-08-13 NOTE — TELEPHONE ENCOUNTER
Pt would like to know if the doctor needs to see him in order to complete the forms. Please, call pt at [phone].

## 2019-08-13 NOTE — TELEPHONE ENCOUNTER
Called and left voicemail to obtain further details regarding FMLA. Current FMLA was completed in April, forms are current for 6 months, startin19 ending 10/26/19.     -New FMLA ONLY needed for a new DX which will require an office visit w/ PCP.     -please transfer pt to forms dept upon call back [phone]

## 2019-08-13 NOTE — TELEPHONE ENCOUNTER
Attempted to reach patient to find out what glucose machine he has so correct strips can be sent to pharmacy. Patient unavailable. LMTCB. Transfer to triage.

## 2019-08-14 NOTE — TELEPHONE ENCOUNTER
Revised FMLA and faxed to [facility] - [phone]. Mailed copy to pt. Pt aware.

## 2019-08-15 NOTE — TELEPHONE ENCOUNTER
Patient states that new claim number needed to be attached with revised form.   CLAIM #[id number]

## 2019-09-13 ENCOUNTER — TELEPHONE (OUTPATIENT)
Dept: FAMILY MEDICINE CLINIC | Facility: CLINIC | Age: 44
End: 2019-09-13

## 2019-09-13 ENCOUNTER — OFFICE VISIT (OUTPATIENT)
Dept: FAMILY MEDICINE CLINIC | Facility: CLINIC | Age: 44
End: 2019-09-13
Payer: COMMERCIAL

## 2019-09-13 VITALS
SYSTOLIC BLOOD PRESSURE: 141 MMHG | DIASTOLIC BLOOD PRESSURE: 84 MMHG | BODY MASS INDEX: 53 KG/M2 | RESPIRATION RATE: 18 BRPM | HEART RATE: 76 BPM | WEIGHT: 315 LBS | TEMPERATURE: 98 F

## 2019-09-13 DIAGNOSIS — J06.9 VIRAL URI WITH COUGH: Primary | ICD-10-CM

## 2019-09-13 DIAGNOSIS — G43.709 CHRONIC MIGRAINE WITHOUT AURA WITHOUT STATUS MIGRAINOSUS, NOT INTRACTABLE: ICD-10-CM

## 2019-09-13 PROCEDURE — 99214 OFFICE O/P EST MOD 30 MIN: CPT | Performed by: FAMILY MEDICINE

## 2019-09-13 NOTE — TELEPHONE ENCOUNTER
Disability Claim Form from [employer] received in the [facility] office via fax for patient. Form emailed to [email or web address], original form left in the [facility] office for completion. Patient has an appointment Friday 9/13/19 with Dr. Maggie Lang.

## 2019-09-13 NOTE — TELEPHONE ENCOUNTER
Pt wants to know if office received a fax from [facility] and is requesting a call back. He also wants to relay the fax number for the [facility] fax# [phone]. Please advise.

## 2019-09-13 NOTE — PROGRESS NOTES
HPI:    Vaishali Grayson is a 53year old male presents to clinic for follow-up regarding migraines. Reports that over the past month, these have become more frequent, feels that stress at work as a trigger.   Patient is getting severe bilateral headaches maxillary sinus tenderness and no frontal sinus tenderness. Left sinus exhibits no maxillary sinus tenderness and no frontal sinus tenderness.    Mouth/Throat: Uvula is midline, oropharynx is clear and moist and mucous membranes are normal.   Eyes: Pupils a

## 2019-09-16 NOTE — TELEPHONE ENCOUNTER
Attending Provider Statement received in the [facility] office via fax for patient. Form emailed to [email or web address], original form left in the [facility] office for completion.

## 2019-09-19 NOTE — TELEPHONE ENCOUNTER
Dr. Temi Rodriguez    Please sign off on form:  -Highlight the patient and hit \"Chart\" button. -In Chart Review, w/in the Encounter tab - click 1 time on the Telephone call encounter for 9/13/19.  Scroll down the telephone encounter.  -Click \"scan on\" blue H

## 2019-09-20 NOTE — TELEPHONE ENCOUNTER
-Disab forms faxed to [facility] group @ [phone], confirm rcvd. Copy mailed to pt.    -Disab forms faxed to [address] @ [phone], confirm rcvd. Copy mailed to pt.

## 2019-10-01 NOTE — TELEPHONE ENCOUNTER
Per pt, [facility] states provider did not sign. Informed pt that provider signed electronically. Pt states he will call [facility] to see if electronic signature is acceptable.

## 2019-10-09 ENCOUNTER — TELEPHONE (OUTPATIENT)
Dept: FAMILY MEDICINE CLINIC | Facility: CLINIC | Age: 44
End: 2019-10-09

## 2019-10-14 ENCOUNTER — OFFICE VISIT (OUTPATIENT)
Dept: ENDOCRINOLOGY CLINIC | Facility: CLINIC | Age: 44
End: 2019-10-14
Payer: COMMERCIAL

## 2019-10-14 VITALS
HEART RATE: 65 BPM | BODY MASS INDEX: 53 KG/M2 | DIASTOLIC BLOOD PRESSURE: 87 MMHG | WEIGHT: 315 LBS | SYSTOLIC BLOOD PRESSURE: 130 MMHG

## 2019-10-14 DIAGNOSIS — E11.65 TYPE 2 DIABETES MELLITUS WITH HYPERGLYCEMIA, WITHOUT LONG-TERM CURRENT USE OF INSULIN (HCC): Primary | ICD-10-CM

## 2019-10-14 PROCEDURE — 82962 GLUCOSE BLOOD TEST: CPT | Performed by: INTERNAL MEDICINE

## 2019-10-14 PROCEDURE — 36416 COLLJ CAPILLARY BLOOD SPEC: CPT | Performed by: INTERNAL MEDICINE

## 2019-10-14 PROCEDURE — 83036 HEMOGLOBIN GLYCOSYLATED A1C: CPT | Performed by: INTERNAL MEDICINE

## 2019-10-14 PROCEDURE — 99213 OFFICE O/P EST LOW 20 MIN: CPT | Performed by: INTERNAL MEDICINE

## 2019-10-14 RX ORDER — GLIPIZIDE 5 MG/1
5 TABLET ORAL
Qty: 180 TABLET | Refills: 0 | Status: SHIPPED | OUTPATIENT
Start: 2019-10-14 | End: 2020-01-06

## 2019-10-14 NOTE — PROGRESS NOTES
Return Office Visit     CHIEF COMPLAINT:    DM     HISTORY OF PRESENT ILLNESS:  Marina Pelletier is a 53year old male who presents for follow up for for Diabetes.     DM HISTORY  Diagnosed: 3/2017    HISTORY OF DIABETES COMPLICATIONS: :  History of Retinopat anxiety  Hematology/Lymphatics: Negative for: bruising, lower extremity edema  Endocrine: Negative for: polyuria, polydypsia  Skin: Negative for: rash, blister, cellulitis,       PHYSICAL EXAM:    10/14/19  1109   BP: 130/87   Pulse: 65   Weight: (!) 387 l great detail, to get log and glucometer on next visit. f). Life style changes: Diet: low carbohydrate diet discussed, Exercise: should target a weight loss of 7% and increase exercise to at least 150min a week.  g).  Hypoglycemia recognition and managemen

## 2019-10-16 ENCOUNTER — OFFICE VISIT (OUTPATIENT)
Dept: FAMILY MEDICINE CLINIC | Facility: CLINIC | Age: 44
End: 2019-10-16
Payer: COMMERCIAL

## 2019-10-16 VITALS
WEIGHT: 315 LBS | RESPIRATION RATE: 18 BRPM | DIASTOLIC BLOOD PRESSURE: 82 MMHG | HEART RATE: 68 BPM | HEIGHT: 72 IN | BODY MASS INDEX: 42.66 KG/M2 | TEMPERATURE: 98 F | SYSTOLIC BLOOD PRESSURE: 132 MMHG

## 2019-10-16 DIAGNOSIS — E11.9 TYPE 2 DIABETES MELLITUS WITHOUT COMPLICATION, WITHOUT LONG-TERM CURRENT USE OF INSULIN (HCC): ICD-10-CM

## 2019-10-16 DIAGNOSIS — G43.709 CHRONIC MIGRAINE WITHOUT AURA WITHOUT STATUS MIGRAINOSUS, NOT INTRACTABLE: Primary | ICD-10-CM

## 2019-10-16 PROCEDURE — 99214 OFFICE O/P EST MOD 30 MIN: CPT | Performed by: FAMILY MEDICINE

## 2019-10-16 NOTE — TELEPHONE ENCOUNTER
Revised attend stmnt w/ current OV notes and RTW letter faxed to [address] [place] [address] @ [phone], confirm rcvd.

## 2019-10-16 NOTE — PROGRESS NOTES
HPI:    Jimmy Ward is a 53year old male presents to clinic for follow-up regarding migraines. Recently took a leave of absence from work as these were getting more severe, they did improve.   Would like to return to work on Monday, needs a note to r (primary encounter diagnosis)  Plan:  -Chronic issue for patient, stable. Cleared to return to work on Monday with no restrictions.   Follow-up as needed.    (E11.9) Type 2 diabetes mellitus without complication, without long-term current use of insulin (H

## 2019-10-21 ENCOUNTER — APPOINTMENT (OUTPATIENT)
Dept: ENDOCRINOLOGY | Facility: HOSPITAL | Age: 44
End: 2019-10-21
Attending: FAMILY MEDICINE
Payer: COMMERCIAL

## 2019-10-29 ENCOUNTER — HOSPITAL ENCOUNTER (OUTPATIENT)
Dept: ENDOCRINOLOGY | Facility: HOSPITAL | Age: 44
Discharge: HOME | End: 2019-10-29
Attending: FAMILY MEDICINE
Payer: COMMERCIAL

## 2019-10-29 VITALS — WEIGHT: 315 LBS | BODY MASS INDEX: 54 KG/M2

## 2019-10-29 NOTE — PROGRESS NOTES
Bala Peralta  : 1975 attended individual initial assessment for Diabetes Education:    Date: 10/29/2019   Start time: 0800 End time: 0900    HEMOGLOBIN A1C (%)   Date Value   10/14/2019 10.5 (A)        Assessment:     Patient presents with referr Post-prandial <180 mg/dL  Demonstrated ability to perform blood glucose testing.      Problem Solving: Prevention, detection and treatment of acute complications: taught symptoms of hypoglycemia, hyperglycemia, how to treat low blood sugar (Rule of 15) and

## 2019-11-06 ENCOUNTER — APPOINTMENT (OUTPATIENT)
Dept: ENDOCRINOLOGY | Facility: HOSPITAL | Age: 44
End: 2019-11-06
Attending: FAMILY MEDICINE
Payer: COMMERCIAL

## 2019-11-13 ENCOUNTER — APPOINTMENT (OUTPATIENT)
Dept: ENDOCRINOLOGY | Facility: HOSPITAL | Age: 44
End: 2019-11-13
Attending: FAMILY MEDICINE
Payer: COMMERCIAL

## 2019-11-20 ENCOUNTER — APPOINTMENT (OUTPATIENT)
Dept: ENDOCRINOLOGY | Facility: HOSPITAL | Age: 44
End: 2019-11-20
Attending: FAMILY MEDICINE
Payer: COMMERCIAL

## 2019-12-26 ENCOUNTER — APPOINTMENT (OUTPATIENT)
Dept: ENDOCRINOLOGY | Facility: HOSPITAL | Age: 44
End: 2019-12-26
Attending: FAMILY MEDICINE
Payer: COMMERCIAL

## 2020-01-06 ENCOUNTER — TELEPHONE (OUTPATIENT)
Dept: ENDOCRINOLOGY CLINIC | Facility: CLINIC | Age: 45
End: 2020-01-06

## 2020-01-06 ENCOUNTER — APPOINTMENT (OUTPATIENT)
Dept: LAB | Facility: HOSPITAL | Age: 45
End: 2020-01-06
Attending: INTERNAL MEDICINE
Payer: COMMERCIAL

## 2020-01-06 ENCOUNTER — OFFICE VISIT (OUTPATIENT)
Dept: ENDOCRINOLOGY CLINIC | Facility: CLINIC | Age: 45
End: 2020-01-06
Payer: COMMERCIAL

## 2020-01-06 VITALS
HEART RATE: 72 BPM | WEIGHT: 315 LBS | DIASTOLIC BLOOD PRESSURE: 86 MMHG | BODY MASS INDEX: 54 KG/M2 | SYSTOLIC BLOOD PRESSURE: 120 MMHG

## 2020-01-06 DIAGNOSIS — E11.65 TYPE 2 DIABETES MELLITUS WITH HYPERGLYCEMIA, WITHOUT LONG-TERM CURRENT USE OF INSULIN (HCC): Primary | ICD-10-CM

## 2020-01-06 PROBLEM — E66.9 OBESITY: Status: ACTIVE | Noted: 2020-01-06

## 2020-01-06 LAB
ANION GAP SERPL CALC-SCNC: 6 MMOL/L (ref 0–18)
BUN BLD-MCNC: 21 MG/DL (ref 7–18)
BUN/CREAT SERPL: 17.9 (ref 10–20)
CALCIUM BLD-MCNC: 8.8 MG/DL (ref 8.5–10.1)
CARTRIDGE LOT#: ABNORMAL NUMERIC
CHLORIDE SERPL-SCNC: 109 MMOL/L (ref 98–112)
CHOLEST SMN-MCNC: 148 MG/DL (ref ?–200)
CO2 SERPL-SCNC: 27 MMOL/L (ref 21–32)
CREAT BLD-MCNC: 1.17 MG/DL (ref 0.7–1.3)
CREAT UR-SCNC: 155 MG/DL
GLUCOSE BLD-MCNC: 151 MG/DL (ref 70–99)
GLUCOSE BLOOD: 144
HDLC SERPL-MCNC: 59 MG/DL (ref 40–59)
HEMOGLOBIN A1C: 8 % (ref 4.3–5.6)
LDLC SERPL CALC-MCNC: 79 MG/DL (ref ?–100)
MICROALBUMIN UR-MCNC: 2.89 MG/DL
MICROALBUMIN/CREAT 24H UR-RTO: 18.6 UG/MG (ref ?–30)
NONHDLC SERPL-MCNC: 89 MG/DL (ref ?–130)
OSMOLALITY SERPL CALC.SUM OF ELEC: 300 MOSM/KG (ref 275–295)
PATIENT FASTING Y/N/NP: YES
PATIENT FASTING Y/N/NP: YES
POTASSIUM SERPL-SCNC: 4 MMOL/L (ref 3.5–5.1)
SODIUM SERPL-SCNC: 142 MMOL/L (ref 136–145)
TEST STRIP LOT #: NORMAL NUMERIC
TRIGL SERPL-MCNC: 49 MG/DL (ref 30–149)
VLDLC SERPL CALC-MCNC: 10 MG/DL (ref 0–30)

## 2020-01-06 PROCEDURE — 82962 GLUCOSE BLOOD TEST: CPT | Performed by: INTERNAL MEDICINE

## 2020-01-06 PROCEDURE — 80061 LIPID PANEL: CPT | Performed by: INTERNAL MEDICINE

## 2020-01-06 PROCEDURE — 36415 COLL VENOUS BLD VENIPUNCTURE: CPT | Performed by: INTERNAL MEDICINE

## 2020-01-06 PROCEDURE — 80048 BASIC METABOLIC PNL TOTAL CA: CPT | Performed by: INTERNAL MEDICINE

## 2020-01-06 PROCEDURE — 82043 UR ALBUMIN QUANTITATIVE: CPT | Performed by: INTERNAL MEDICINE

## 2020-01-06 PROCEDURE — 82570 ASSAY OF URINE CREATININE: CPT | Performed by: INTERNAL MEDICINE

## 2020-01-06 PROCEDURE — 83036 HEMOGLOBIN GLYCOSYLATED A1C: CPT | Performed by: INTERNAL MEDICINE

## 2020-01-06 PROCEDURE — 36416 COLLJ CAPILLARY BLOOD SPEC: CPT | Performed by: INTERNAL MEDICINE

## 2020-01-06 PROCEDURE — 99213 OFFICE O/P EST LOW 20 MIN: CPT | Performed by: INTERNAL MEDICINE

## 2020-01-06 RX ORDER — GLIPIZIDE 5 MG/1
7.5 TABLET ORAL
Qty: 270 TABLET | Refills: 0 | Status: SHIPPED | OUTPATIENT
Start: 2020-01-06 | End: 2020-05-04

## 2020-01-06 NOTE — PROGRESS NOTES
Return Office Visit     CHIEF COMPLAINT:    DM   Obesity    HISTORY OF PRESENT ILLNESS:  Vamsi Meeks is a 53year old male who presents for follow up for for Diabetes.     DM HISTORY  Diagnosed: 3/2017    HISTORY OF DIABETES COMPLICATIONS: :  History of weakness  Genito-Urinary: Negative for: dysuria, frequency  Psychiatric: Negative for:  depression, anxiety  Hematology/Lymphatics: Negative for: bruising, lower extremity edema  Endocrine: Negative for: polyuria, polydypsia  Skin: Negative for: rash, blis maintainence explained in great detail, to get log and glucometer on next visit. f). Life style changes reviewed again. g). Hypoglycemia recognition and management discussed    2.  Patient’s BP is normal today.   3. Annual fasting lipid panel ordered  A

## 2020-05-04 RX ORDER — GLIPIZIDE 5 MG/1
7.5 TABLET ORAL
Qty: 270 TABLET | Refills: 0 | Status: SHIPPED | OUTPATIENT
Start: 2020-05-04 | End: 2020-07-26

## 2020-05-13 ENCOUNTER — TELEPHONE (OUTPATIENT)
Dept: ENDOCRINOLOGY CLINIC | Facility: CLINIC | Age: 45
End: 2020-05-13

## 2020-05-13 NOTE — TELEPHONE ENCOUNTER
Patient was called multiple times for a phone visit/   No answer, VM left   Please call in a few days to reschedule  Thanks

## 2020-06-02 NOTE — TELEPHONE ENCOUNTER
Called one more time, no response  Please send a letter stating that we have been trying to contact him and that he should please call us  Thanks

## 2020-07-26 ENCOUNTER — TELEPHONE (OUTPATIENT)
Dept: ENDOCRINOLOGY CLINIC | Facility: CLINIC | Age: 45
End: 2020-07-26

## 2020-07-27 RX ORDER — GLIPIZIDE 5 MG/1
7.5 TABLET ORAL
Qty: 90 TABLET | Refills: 0 | Status: SHIPPED | OUTPATIENT
Start: 2020-07-27 | End: 2020-08-28
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
## 2020-08-28 RX ORDER — GLIPIZIDE 5 MG/1
7.5 TABLET ORAL
Qty: 90 TABLET | Refills: 0 | Status: SHIPPED | OUTPATIENT
Start: 2020-08-28 | End: 2020-10-02

## 2020-10-02 RX ORDER — GLIPIZIDE 5 MG/1
7.5 TABLET ORAL
Qty: 90 TABLET | Refills: 0 | Status: SHIPPED | OUTPATIENT
Start: 2020-10-02 | End: 2021-02-03

## 2020-10-19 ENCOUNTER — TELEPHONE (OUTPATIENT)
Dept: ENDOCRINOLOGY CLINIC | Facility: CLINIC | Age: 45
End: 2020-10-19

## 2020-10-19 NOTE — TELEPHONE ENCOUNTER
Pharmacy is requesting 90 day supply for:      •  GLIPIZIDE 5 MG Oral Tab, TAKE 1.5 TABLETS (7.5 MG TOTAL) BY MOUTH 2 (TWO) TIMES DAILY BEFORE MEALS., Disp: 270 tablet, Rfl: 0

## 2020-10-19 NOTE — TELEPHONE ENCOUNTER
LOV 1/06/20. RTC 3-4 months. No F/u. Called to schedule first available. Pt states he will find out his work schedule next week and would like to be called back.

## 2021-02-03 DIAGNOSIS — E11.65 TYPE 2 DIABETES MELLITUS WITH HYPERGLYCEMIA, WITHOUT LONG-TERM CURRENT USE OF INSULIN (HCC): Primary | ICD-10-CM

## 2021-02-03 RX ORDER — GLIPIZIDE 5 MG/1
7.5 TABLET ORAL
Qty: 90 TABLET | Refills: 0 | Status: SHIPPED | OUTPATIENT
Start: 2021-02-03

## 2021-02-03 NOTE — TELEPHONE ENCOUNTER
Pt called for refill:       Current Outpatient Medications:   •  GLIPIZIDE 5 MG Oral Tab, TAKE 1.5 TABLETS (7.5 MG TOTAL) BY MOUTH 2 (TWO) TIMES DAILY BEFORE MEALS., Disp: 90 tablet, Rfl: 0

## 2021-02-05 ENCOUNTER — TELEPHONE (OUTPATIENT)
Dept: ENDOCRINOLOGY CLINIC | Facility: CLINIC | Age: 46
End: 2021-02-05

## 2021-02-05 ENCOUNTER — TELEMEDICINE (OUTPATIENT)
Dept: ENDOCRINOLOGY CLINIC | Facility: CLINIC | Age: 46
End: 2021-02-05
Payer: COMMERCIAL

## 2021-02-05 DIAGNOSIS — E11.65 TYPE 2 DIABETES MELLITUS WITH HYPERGLYCEMIA, WITHOUT LONG-TERM CURRENT USE OF INSULIN (HCC): Primary | ICD-10-CM

## 2021-02-05 PROCEDURE — 99213 OFFICE O/P EST LOW 20 MIN: CPT | Performed by: INTERNAL MEDICINE

## 2021-02-05 NOTE — PROGRESS NOTES
Lianna Hull verbally consents to a video visit on 2/5/2021     Patient understands and accepts financial responsibility for any deductible, co-insurance and/or co-pays associated with this service. Patient has been referred to the [facility] website at www. e SYSTEMS:  Constitutional: Negative for: weight change, fever, fatigue, cold/heat intolerance  Eyes: Negative for:  Visual changes, proptosis, blurring  ENT: Negative for:  dysphagia, neck swelling, dysphonia  Respiratory: Negative for:  dyspnea, cough  Car medication  Discussed importance of regular blood work and checking BG    Glipizide  7.5 mg BID  Discussed risk of hypoglycemia  Eat three regular meals  Check and call with BG as discussed. Will also get an a1c     He is a .  Discussed that he

## 2021-03-01 NOTE — TELEPHONE ENCOUNTER
Noted  Please check on a regular basis and call back in 1-2 weeks  Also when able please do blood work  [facility] he feels better soon!   Thanks

## 2021-03-01 NOTE — TELEPHONE ENCOUNTER
Called patient and he states he had surgery 7 days ago on his right knee. He states he is unable to get blood sugar readings due to \"being immobile\" states that this morning his BG was 137 and only checks once daily in the mornings.  Only on Glipizide 5mg

## (undated) NOTE — Clinical Note
Thank you for the consult. I saw Mr. Chris Ferrari in the endocrine/diabetes clinic today. Please see attached my note. Please feel free to contact me with any questions. Thanks!

## (undated) NOTE — MR AVS SNAPSHOT
[facility] [place] [facility]  [address], [address]  [phone]               Thank you for choosing us for your health care visit with Joni Barron MD.  We are glad to serve you and happy to provide you with this summary o You have not been prescribed any medications. CAPN[facility]     Sign up for [employer], your secure online medical record. [employer] will allow you to access patient instructions from your recent visit,  view other health information, and more.  To sign u Get your heart pumping – brisk walking, biking, swimming Even 10 minute increments are effective and add up over the week   2 ½ hours per week – spread out over time Use a madisyn to keep you motivated   Don’t forget strength training with weights and resist

## (undated) NOTE — LETTER
7/28/2017          To Whom It May Concern:    Oleg Cortes is currently under my medical care and suffers from migraine headaches. Please excuse his absence on 6/22/17 and 7/17/17 as he was suffering from symptoms.    If you require additional information

## (undated) NOTE — LETTER
6/2/2020              Matilda Aguilera        [address]        [place] [address]         Dear Hailee Anthony,    This letter is to inform you that our office has made several attempts to reach you by phone without success.   We were attempting to contact you by oanh

## (undated) NOTE — MR AVS SNAPSHOT
[facility]  [address], [address]  [phone]               Thank you for choosing us for your health care visit with Fabian Adames MD.  We are glad to serve you and happy to provide you with this summary Insulin Pen Needle 32G X 4 MM Misc   Inject daily   Commonly known as:  BD PEN NEEDLE MEME U/F           Liraglutide 18 MG/3ML Sopn   Inject 1.8 mg into the skin daily.    Commonly known as:  VICTOZA           MetFORMIN HCl 1000 MG Tabs   Take 1 tablet Your blood pressure indicates you may be at-risk for Hypertension. Please consider the following Lifestyle Modifications. Also, please return for a follow-up Blood Pressure Check in 1 month.      Lifestyle Modification Recommendations:    Modification R

## (undated) NOTE — LETTER
10/16/2019          To Whom It May Concern:    Anuradha Arteaga is currently under my medical care and was seen in clinic for a follow up visit. He is cleared to return to work on Monday, 10/21/2019, without restrictions.    If you require additional informat

## (undated) NOTE — LETTER
2/1/2019          To Whom It May Concern:    Abhijeet Pereira is currently under my medical care and was seen for a follow up visit. He is cleared to work on 2/1/19 with no restrictions. If you require additional information please contact our office.

## (undated) NOTE — LETTER
3/25/2021              [address] Bergeron         [address]        [place] [address]         Dear Dom Booth,    [address] [facility] records indicate that the tests ordered for you by Orlando Graves MD  have not been done.   If you have, in fact, already completed the tests o

## (undated) NOTE — LETTER
4/14/2021              [address]        [place] [address]         Dear Venessa Aponte,    This letter is to inform you that our office has made several attempts to reach you by phone without success.     Please contact our office at the nu

## (undated) NOTE — MR AVS SNAPSHOT
[address]  [phone]               Thank you for choosing us for your health care visit with Mario Guaman MD.  We are glad to serve you and happy to provide you with this summary of your visit. GLUCOSE BLOOD TEST      Component Value Standard Range & Units    GLUCOSE 129     Test Strip Lot # [id number] Numeric    Test Strip Expiration Date 1/31/2018 Date                  [employer]     Sign up for [employer], your secure online medical record.   [employer]

## (undated) NOTE — LETTER
5/22/2019              [address]        [address]        [place] [address]         Dear Maci Carpio,    [address] [facility] records indicate that the annual blood and urine tests ordered for you by Lata Higuera MD have not been done.   If you have, in fact, already